# Patient Record
Sex: FEMALE | Race: WHITE | NOT HISPANIC OR LATINO | Employment: FULL TIME | URBAN - METROPOLITAN AREA
[De-identification: names, ages, dates, MRNs, and addresses within clinical notes are randomized per-mention and may not be internally consistent; named-entity substitution may affect disease eponyms.]

---

## 2017-08-16 ENCOUNTER — LAB REQUISITION (OUTPATIENT)
Dept: LAB | Facility: HOSPITAL | Age: 22
End: 2017-08-16
Payer: COMMERCIAL

## 2017-08-16 ENCOUNTER — ALLSCRIPTS OFFICE VISIT (OUTPATIENT)
Dept: OTHER | Facility: OTHER | Age: 22
End: 2017-08-16

## 2017-08-16 DIAGNOSIS — Z01.419 ENCOUNTER FOR GYNECOLOGICAL EXAMINATION WITHOUT ABNORMAL FINDING: ICD-10-CM

## 2017-08-16 PROCEDURE — G0143 SCR C/V CYTO,THINLAYER,RESCR: HCPCS | Performed by: OBSTETRICS & GYNECOLOGY

## 2017-08-21 LAB
LAB AP GYN PRIMARY INTERPRETATION: NORMAL
LAB AP LMP: NORMAL
Lab: NORMAL

## 2018-01-11 NOTE — MISCELLANEOUS
Message  Pt called she is at college and noticed a golf ball size lump in her vagina no itching, no d/c, not sore to touch it is internal Spoke to Dr Galina Toribio he will treat like a Bartholin cyst Keflex QID for 1 week warms soaks and if no better need to go to urgent care or health Center at school Rx called to [facility]      Active Problems    1  Bartholin gland cyst (616 2) (N75 0)   2  Dysmenorrhea (625 3) (N94 6)   3  Heavy Bleeding Between Periods (Metrorrhagia) (626 6)   4  Menometrorrhagia (626 2) (N92 1)   5  Vaginal candidiasis (112 1) (B37 3)   6  Vaginal itching (698 1) (L29 8)    Current Meds   1  Cephalexin 500 MG Oral Capsule; TAKE 1 CAPSULE 4 TIMES DAILY UNTIL GONE;   Therapy: 15KLW3067 to (Evaluate:90Ayp6765)  Requested for: 76Kxt2549; Last   Rx:[id number]; Status: ACTIVE - Retrospective Authorization Ordered   2  Junel FE 1 5/30 1 5-30 MG-MCG Oral Tablet; TAKE 1 TABLET DAILY AS DIRECTED; Therapy: 69ORV9848 to (Evaluate:40Sya4525)  Requested for: 46JOU8968; Last   Rx:[id number] Ordered   3  Terconazole 0 4 % Vaginal Cream; INSERT 1 APPLICATORFUL INTRAVAGINALLY AT   BEDTIME; Therapy: 58Fot2460 to (Last Rx:01Mlx2811)  Requested for: 70Lxu1575 Ordered    Allergies    1   Animal dander - Cats    Signatures   Electronically signed by : Román Dietz, ; Aug 19 2016 11:35AM EST                       (Author)

## 2018-01-14 VITALS
DIASTOLIC BLOOD PRESSURE: 88 MMHG | SYSTOLIC BLOOD PRESSURE: 128 MMHG | HEIGHT: 68 IN | WEIGHT: 293 LBS | BODY MASS INDEX: 44.41 KG/M2

## 2018-01-17 NOTE — MISCELLANEOUS
Message  Pt still getting BTB and prolonged periods was told to call or dose change Spoke to [place] will increase to 1 5/30 will start with new pack      Active Problems    1  Dysmenorrhea (625 3) (N94 6)   2  Heavy Bleeding Between Periods (Metrorrhagia) (626 6)   3  Menometrorrhagia (626 2) (N92 1)   4  Vaginal candidiasis (112 1) (B37 3)   5  Vaginal itching (698 1) (L29 8)    Current Meds   1  Terconazole 0 4 % Vaginal Cream; INSERT 1 APPLICATORFUL INTRAVAGINALLY AT   BEDTIME; Therapy: 39Nby8070 to (Last Rx:98Tuw3174)  Requested for: 06Ukc3695 Ordered    Allergies    1   Animal dander - Cats    Plan  Menometrorrhagia    · Junel FE 1 5/30 1 5-30 MG-MCG Oral Tablet; TAKE 1 TABLET DAILY AS  DIRECTED    Signatures   Electronically signed by : Brittany Marti, ; Jun 6 2016  3:59PM EST                       (Author)

## 2018-02-26 ENCOUNTER — TRANSCRIBE ORDERS (OUTPATIENT)
Dept: ADMINISTRATIVE | Facility: HOSPITAL | Age: 23
End: 2018-02-26

## 2018-02-26 DIAGNOSIS — S03.00XA DISLOCATION OF TEMPOROMANDIBULAR JOINT, INITIAL ENCOUNTER: Primary | ICD-10-CM

## 2018-03-12 ENCOUNTER — HOSPITAL ENCOUNTER (OUTPATIENT)
Dept: RADIOLOGY | Facility: HOSPITAL | Age: 23
Discharge: HOME | End: 2018-03-12
Payer: COMMERCIAL

## 2018-03-12 DIAGNOSIS — S03.00XA DISLOCATION OF TEMPOROMANDIBULAR JOINT, INITIAL ENCOUNTER: ICD-10-CM

## 2018-03-12 PROCEDURE — 70336 MAGNETIC IMAGE JAW JOINT: CPT

## 2018-05-16 ENCOUNTER — EVALUATION (OUTPATIENT)
Dept: PHYSICAL THERAPY | Facility: REHABILITATION | Age: 23
End: 2018-05-16
Payer: COMMERCIAL

## 2018-05-16 DIAGNOSIS — R68.84 JAW PAIN: Primary | ICD-10-CM

## 2018-05-16 PROCEDURE — 97140 MANUAL THERAPY 1/> REGIONS: CPT | Performed by: PHYSICAL THERAPIST

## 2018-05-16 PROCEDURE — G8991 OTHER PT/OT GOAL STATUS: HCPCS | Performed by: PHYSICAL THERAPIST

## 2018-05-16 PROCEDURE — 97161 PT EVAL LOW COMPLEX 20 MIN: CPT | Performed by: PHYSICAL THERAPIST

## 2018-05-16 PROCEDURE — G8990 OTHER PT/OT CURRENT STATUS: HCPCS | Performed by: PHYSICAL THERAPIST

## 2018-05-16 RX ORDER — NORETHINDRONE ACETATE AND ETHINYL ESTRADIOL 1MG-20(21)
1 KIT ORAL DAILY
COMMUNITY
End: 2018-09-21 | Stop reason: ALTCHOICE

## 2018-05-16 NOTE — PROGRESS NOTES
PT Evaluation     Today's date: 2018  Patient name: Carmel Aden  : 1995  MRN: [id number]  Referring provider: Pat Chandler DMD  Dx:   Encounter Diagnosis     ICD-10-CM    1  Jaw pain R68 84        Start Time: 6011  Stop Time: 7247  Total time in clinic (min): 32 minutes    Assessment  Impairments: abnormal or restricted ROM and pain with function    Assessment details: Decreased ROM mandibular depression with mild tenderness at Bilateral TMJ  Good for return to full function  Understanding of Dx/Px/POC: good  Goals  STG decrease pain 2-3/10  Increase ROM 5-10 mm  LTG decrease pain 0-1/10  ROM 35-40 mm mandibular depression    Plan  Patient would benefit from: skilled PT  Planned therapy interventions: manual therapy and neuromuscular re-education  Frequency: 1x week  Duration in weeks: 4  Treatment plan discussed with: patient  Plan details: Continue with modalities as needed with stretching and strengthening and postural exercise as tolerated          Subjective Evaluation    History of Present Illness  Date of surgery: 5/3/2018  Mechanism of injury: Arthroscopy to inject fluid to shift disc at B TMJ back into place  Pain  Current pain ratin  At best pain ratin  At worst pain rating: 3  Quality: dull ache and discomfort  Relieving factors: ice  Aggravating factors: eating          Objective     Palpation     Additional Palpation Details  Mild tenderness at Bilateral TMJ joints    Active Range of Motion   Cervical/Thoracic Spine   Cervical    Flexion: WFL  Extension: WFL  Left lateral flexion: WFL  Right lateral flexion: Neck active lateral bend right: 75%   Left rotation: Neck active rotation left: 80%   Right rotation: [facility]  TMJ   Jaw observations: facial symmetry within normal limits  Jaw findings: Mandibular depression 20 mm  Mandibular protrusion hyper mobile  Lateral excursion WFL and equal bilaterallly   Patient presents with: no jaw trauma  Dental findings: One filling no other findings  Wearing a mouth guard for night wear  Joint sounds left: normal      Flowsheet Rows      Most Recent Value   PT/OT G-Codes   Current Score  66   Projected Score  82   FOTO information reviewed  Yes   Assessment Type  Evaluation   G code set  Other PT/OT Primary   Other PT Primary Current Status ()  CJ   Other PT Primary Goal Status ()  CI          Precautions: TMJ surgery    Daily Treatment Diary     Manual  5/16            TMJ B MFR 10 min                                                                    Exercise Diary              TMJ active opening x5 with 5 sec hold            Passive mandibular depressino x3 with 10 sec hold            Scapular retraction nv            Cervical retraction nv            Shoulder extension nv Green TB                                                                                                                                                                                                                  Modalities

## 2018-05-16 NOTE — LETTER
May 17, 2018    Sara Reyes DMD  [address]  [address] [address]    Patient: Mikaela Boone   YOB: 1995   Date of Visit: 2018     Encounter Diagnosis     ICD-10-CM    1  Jaw pain R68 84        Dear Dr Gretchen Roche:    Please review the attached Plan of Care from [facility] recent visit  Please verify that you agree therapy should continue by signing the attached document and sending it back to our office  If you have any questions or concerns, please don't hesitate to call  Sincerely,    Brandyn Fernandez, PT      Referring Provider:      I certify that I have read the below Plan of Care and certify the need for these services furnished under this plan of treatment while under my care  Sara Reyes DMD  [address]: [phone]          PT Evaluation     Today's date: 2018  Patient name: Mikaela Boone  : 1995  MRN: [id number]  Referring provider: Russel Castillo DMD  Dx:   Encounter Diagnosis     ICD-10-CM    1  Jaw pain R68 84        Start Time: 1720  Stop Time: 1752  Total time in clinic (min): 32 minutes    Assessment  Impairments: abnormal or restricted ROM and pain with function    Assessment details: Decreased ROM mandibular depression with mild tenderness at Bilateral TMJ  Good for return to full function  Understanding of Dx/Px/POC: good  Goals  STG decrease pain 2-3/10  Increase ROM 5-10 mm  LTG decrease pain 0-1/10  ROM 35-40 mm mandibular depression    Plan  Patient would benefit from: skilled PT  Planned therapy interventions: manual therapy and neuromuscular re-education  Frequency: 1x week  Duration in weeks: 4  Treatment plan discussed with: patient  Plan details: Continue with modalities as needed with stretching and strengthening and postural exercise as tolerated          Subjective Evaluation    History of Present Illness  Date of surgery: 5/3/2018  Mechanism of injury: Arthroscopy to inject fluid to shift disc at B TMJ back into place  Pain  Current pain ratin  At best pain ratin  At worst pain rating: 3  Quality: dull ache and discomfort  Relieving factors: ice  Aggravating factors: eating          Objective     Palpation     Additional Palpation Details  Mild tenderness at Bilateral TMJ joints    Active Range of Motion   Cervical/Thoracic Spine   Cervical    Flexion: WFL  Extension: WFL  Left lateral flexion: WFL  Right lateral flexion: Neck active lateral bend right: 75%   Left rotation: Neck active rotation left: 80%   Right rotation: [facility]  TMJ   Jaw observations: facial symmetry within normal limits  Jaw findings: Mandibular depression 20 mm  Mandibular protrusion hyper mobile  Lateral excursion WFL and equal bilaterallly   Patient presents with: no jaw trauma  Dental findings: One filling no other findings  Wearing a mouth guard for night wear  Joint sounds left: normal      Flowsheet Rows      Most Recent Value   PT/OT G-Codes   Current Score  66   Projected Score  82   FOTO information reviewed  Yes   Assessment Type  Evaluation   G code set  Other PT/OT Primary   Other PT Primary Current Status ()  CJ   Other PT Primary Goal Status ()  CI          Precautions: TMJ surgery    Daily Treatment Diary     Manual              TMJ B MFR 10 min                                                                    Exercise Diary              TMJ active opening x5 with 5 sec hold            Passive mandibular depressino x3 with 10 sec hold            Scapular retraction nv            Cervical retraction nv            Shoulder extension nv Green TB                                                                                                                                                                                                                  Modalities

## 2018-05-23 ENCOUNTER — OFFICE VISIT (OUTPATIENT)
Dept: PHYSICAL THERAPY | Facility: REHABILITATION | Age: 23
End: 2018-05-23
Payer: COMMERCIAL

## 2018-05-23 DIAGNOSIS — R68.84 JAW PAIN: Primary | ICD-10-CM

## 2018-05-23 PROCEDURE — 97112 NEUROMUSCULAR REEDUCATION: CPT | Performed by: PHYSICAL THERAPIST

## 2018-05-23 NOTE — PROGRESS NOTES
Daily Note     Today's date: 2018  Patient name: Reinier Garcia  : 1995  MRN: [id number]  Referring provider: Treva Zepeda DMD  Dx:   Encounter Diagnosis     ICD-10-CM    1  Jaw pain R68 84                   Subjective: left side is starting to pop and having pain after the fact      Objective: See treatment diary below     Precautions: TMJ surgery     Daily Treatment Diary      Manual                     TMJ B MFR 10 min  8 min                                                                                            ROM  20 D 25D                         Exercise Diary                        TMJ active opening x5 with 5 sec hold  x7 with 5 sec hold                   Passive mandibular depressino x3 with 10 sec hold  x5 with 10 sec hold                   Scapular retraction nv  x 10 with 5 sec hold                   Cervical retraction nv  x 10 with 5 sechold                   Shoulder extension nv Green TB x 20                                                                                                                                                                                                                                                                                                                                                                                                 Modalities                                                                                                         Assessment: Tolerated treatment well  Patient exhibited good technique with therapeutic exercises  Actively opening to 25 mm but on last repetition started to have pain and dropped back to 22 mm      Plan: Continue per plan of care

## 2018-05-31 ENCOUNTER — APPOINTMENT (OUTPATIENT)
Dept: PHYSICAL THERAPY | Facility: REHABILITATION | Age: 23
End: 2018-05-31
Payer: COMMERCIAL

## 2018-06-01 ENCOUNTER — OFFICE VISIT (OUTPATIENT)
Dept: PHYSICAL THERAPY | Facility: REHABILITATION | Age: 23
End: 2018-06-01
Payer: COMMERCIAL

## 2018-06-01 DIAGNOSIS — R68.84 JAW PAIN: Primary | ICD-10-CM

## 2018-06-01 PROCEDURE — 97140 MANUAL THERAPY 1/> REGIONS: CPT | Performed by: PHYSICAL THERAPIST

## 2018-06-06 ENCOUNTER — OFFICE VISIT (OUTPATIENT)
Dept: PHYSICAL THERAPY | Facility: REHABILITATION | Age: 23
End: 2018-06-06
Payer: COMMERCIAL

## 2018-06-06 DIAGNOSIS — R68.84 JAW PAIN: Primary | ICD-10-CM

## 2018-06-06 PROCEDURE — 97140 MANUAL THERAPY 1/> REGIONS: CPT | Performed by: PHYSICAL THERAPIST

## 2018-06-06 NOTE — PROGRESS NOTES
Daily Note     Today's date: 2018  Patient name: Mikael Castro  : 1995  MRN: [id number]  Referring provider: Smita Sands DMD  Dx:   Encounter Diagnosis     ICD-10-CM    1  Jaw pain R68 84                   Subjective: I think I was clenching last night because the left side is really sore  Rating pain at 5/10      Objective: See treatment diary below     Precautions: TMJ surgery     Daily Treatment Diary      Manual                 TMJ B MFR 10 min  8 min  8 min  10min                                                                                        ROM  20 D 25D  20-25  17-22                     Exercise Diary                        TMJ active opening x5 with 5 sec hold  x7 with 5 sec hold  x7 with 5 sec hold  x 7 with 5 sec hold               Passive mandibular depression x3 with 10 sec hold  x5 with 10 sec hold  x 5 with 10 sec hold  x 5 10 sec hold               Scapular retraction nv  x 10 with 5 sec hold  green tb x 20  blue tb x 20               Cervical retraction nv  x 10 with 5 sechold  x 10  x 10               Shoulder extension nv Green TB x 20  blue tb x 20  black tb x 20                supine retraction      x 10 with 10 sec hold  x 10               Supine chin tuck with neck flexion     x5 with 5 sec hold  x 7                                                                                                                                                                                                                                                                                                                                             Modalities                                                                                                         Assessment: Tolerated treatment well  Patient exhibited good technique with therapeutic exercises  Slight loss of function with waking with pain this morning    Plan: Continue per plan of care

## 2018-06-13 ENCOUNTER — OFFICE VISIT (OUTPATIENT)
Dept: PHYSICAL THERAPY | Facility: REHABILITATION | Age: 23
End: 2018-06-13
Payer: COMMERCIAL

## 2018-06-13 DIAGNOSIS — R68.84 JAW PAIN: Primary | ICD-10-CM

## 2018-06-13 PROCEDURE — G8991 OTHER PT/OT GOAL STATUS: HCPCS | Performed by: PHYSICAL THERAPIST

## 2018-06-13 PROCEDURE — 97140 MANUAL THERAPY 1/> REGIONS: CPT | Performed by: PHYSICAL THERAPIST

## 2018-06-13 PROCEDURE — G8990 OTHER PT/OT CURRENT STATUS: HCPCS | Performed by: PHYSICAL THERAPIST

## 2018-06-13 PROCEDURE — 97112 NEUROMUSCULAR REEDUCATION: CPT | Performed by: PHYSICAL THERAPIST

## 2018-06-13 RX ORDER — CYCLOBENZAPRINE HCL 10 MG
10 TABLET ORAL 3 TIMES DAILY PRN
COMMUNITY
End: 2018-10-04 | Stop reason: HOSPADM

## 2018-06-13 NOTE — LETTER
2018    Brayden Carmona DMD  [address]  [address] [address]    Patient: Merna Jaimes   YOB: 1995   Date of Visit: 2018     Encounter Diagnosis     ICD-10-CM    1  Jaw pain R68 84        Dear Dr Peggye Jeans:    Please review the attached Plan of Care from [facility] recent visit  Please verify that you agree therapy should continue by signing the attached document and sending it back to our office  If you have any questions or concerns, please don't hesitate to call  Sincerely,    Stephanie Cuadra, PT      Referring Provider:      I certify that I have read the below Plan of Care and certify the need for these services furnished under this plan of treatment while under my care  Brayden Carmona DMD  [address]: [phone]          PT Evaluation     Today's date: 2018  Patient name: Merna Jaimes  : 1995  MRN: [id number]  Referring provider: Jana Nunn DMD  Dx:   Encounter Diagnosis     ICD-10-CM    1  Jaw pain R68 84        Start Time:   Stop Time: 1755  Total time in clinic (min): 25 minutes    Assessment  Impairments: abnormal or restricted ROM and pain with function    Assessment details: Decreased ROM mandibular depression with mild tenderness at Bilateral TMJ  Good for return to full function  Understanding of Dx/Px/POC: good  Goals  STG decrease pain 2-3/10  Increase ROM 5-10 mm  LTG decrease pain 0-1/10  ROM 35-40 mm mandibular depression    Plan  Patient would benefit from: skilled PT  Planned therapy interventions: manual therapy and neuromuscular re-education  Frequency: 1x week  Duration in weeks: 4  Treatment plan discussed with: patient  Plan details: Continue with modalities as needed with stretching and strengthening and postural exercise as tolerated          Subjective Evaluation    History of Present Illness  Date of surgery: 5/3/2018  Mechanism of injury: The doctor gave me a muscle relaxer   Pain  Current pain ratin  At best pain ratin  At worst pain ratin  Quality: radiating, dull ache, throbbing, tight, discomfort and pulling  Relieving factors: ice  Aggravating factors: eating          Objective     Palpation     Additional Palpation Details  Mild tenderness at Bilateral TMJ joints    Active Range of Motion   Cervical/Thoracic Spine   Cervical    Flexion: WFL  Extension: WFL  Left lateral flexion: WFL  Right lateral flexion: Neck active lateral bend right: 75%   Left rotation: Neck active rotation left: 80%   Right rotation: [facility]  TMJ   Jaw observations: facial symmetry within normal limits  Jaw findings: Mandibular depression 25 mm active 30 mm passive  Mandibular protrusion hyper mobile  Lateral excursion WFL and equal bilaterallly   Patient presents with: no jaw trauma  Dental findings: One filling no other findings  Wearing a mouth guard for night wear  Joint sounds left: normal      Flowsheet Rows      Most Recent Value   PT/OT G-Codes   Current Score  69   Projected Score  82   FOTO information reviewed  Yes   Assessment Type  Re-evaluation   G code set  Other PT/OT Primary   Other PT Primary Current Status ()  CJ   Other PT Primary Goal Status ()  CI          Precautions: TMJ surgery     Daily Treatment Diary      Manual               TMJ B MFR 10 min  8 min  8 min  10min  6 min                                                                                      ROM  20 D 25D  20-25  17-22  25--28                   Exercise Diary                        TMJ active opening x5 with 5 sec hold  x7 with 5 sec hold  x7 with 5 sec hold  x 7 with 5 sec hold  x 8 with sec             Passive mandibular depression x3 with 10 sec hold  x5 with 10 sec hold  x 5 with 10 sec hold  x 5 10 sec hold  x 5 with 10 sechold             Scapular retraction nv  x 10 with 5 sec hold  green tb x 20  blue tb x 20  black tb x 20             Cervical retraction nv  x 10 with 5 sechold  x 10  x 10  x 10             Shoulder extension nv Green TB x 20  blue tb x 20  black tb x 20  apollo 35# x                supine retraction      x 10 with 10 sec hold  x 10  x 10             Supine chin tuck with neck flexion     x5 with 5 sec hold  x 7  x 8                                                                                                                                                                                                                                                                                                                                           Modalities

## 2018-06-20 ENCOUNTER — OFFICE VISIT (OUTPATIENT)
Dept: PHYSICAL THERAPY | Facility: REHABILITATION | Age: 23
End: 2018-06-20
Payer: COMMERCIAL

## 2018-06-20 DIAGNOSIS — R68.84 JAW PAIN: Primary | ICD-10-CM

## 2018-06-20 PROCEDURE — 97140 MANUAL THERAPY 1/> REGIONS: CPT | Performed by: PHYSICAL THERAPIST

## 2018-06-20 NOTE — PROGRESS NOTES
Daily Note     Today's date: 2018  Patient name: Jered Yoo  : 1995  MRN: [id number]  Referring provider: Azael Babb DMD  Dx:   Encounter Diagnosis     ICD-10-CM    1  Jaw pain R68 84                   Subjective: I had a lot of pain over the weekend but it was because I was [id number]      Objective: See treatment diary below  Precautions: TMJ surgery     Daily Treatment Diary      Manual             TMJ B MFR 10 min  8 min  8 min  10min  6 min  9 min                                                                                    ROM  20 D 25D  20-25  17-22  25--28  23-                 Exercise Diary                        TMJ active opening x5 with 5 sec hold  x7 with 5 sec hold  x7 with 5 sec hold  x 7 with 5 sec hold  x 8 with sec  x 8           Passive mandibular depression x3 with 10 sec hold  x5 with 10 sec hold  x 5 with 10 sec hold  x 5 10 sec hold  x 5 with 10 sechold  x 5           Scapular retraction nv  x 10 with 5 sec hold  green tb x 20  blue tb x 20  black tb x 20  apollo 15# x 20           Cervical retraction nv  x 10 with 5 sechold  x 10  x 10  x 10  x 10           Shoulder extension nv Green TB x 20  blue tb x 20  black tb x 20  apollo 35# x  15  apollo 35# x 20            supine retraction      x 10 with 5 sec hold  x 10  x 10  x 10 with 5 sec hold           Supine chin tuck with neck flexion     x5 with 5 sec hold  x 7  x 8  x9            prone on elbows with neck extension           x5 with 5 sechold                                                                                                                                                                                                                                                                                                                 Modalities                                                                                                         Assessment: Tolerated treatment well  Patient would benefit from continued PT      Plan: Continue per plan of care

## 2018-06-27 ENCOUNTER — OFFICE VISIT (OUTPATIENT)
Dept: PHYSICAL THERAPY | Facility: REHABILITATION | Age: 23
End: 2018-06-27
Payer: COMMERCIAL

## 2018-06-27 DIAGNOSIS — R68.84 JAW PAIN: Primary | ICD-10-CM

## 2018-06-27 PROCEDURE — 97140 MANUAL THERAPY 1/> REGIONS: CPT | Performed by: PHYSICAL THERAPIST

## 2018-06-27 PROCEDURE — 97112 NEUROMUSCULAR REEDUCATION: CPT | Performed by: PHYSICAL THERAPIST

## 2018-06-27 NOTE — PROGRESS NOTES
Daily Note     Today's date: 2018  Patient name: Isaiah Person  : 1995  MRN: [id number]  Referring provider: Charlie Goodman DMD  Dx:   Encounter Diagnosis     ICD-10-CM    1  Jaw pain R68 84                   Subjective: I had a lot of sharp pain 2-3 days ago and time I opened my mouth   The doctor told me to continue for another month      Objective: See treatment diary below  Precautions: TMJ surgery     Daily Treatment Diary    27  Manual           TMJ B MFR 10 min  8 min  8 min  10min  6 min  9 min  +cervical 10 min                                                                                  ROM  20 D 25D  20-25  17-  25--28    25-30               Exercise Diary                        TMJ active opening x5 with 5 sec hold  x7 with 5 sec hold  x7 with 5 sec hold  x 7 with 5 sec hold  x 8 with sec  x 8  x8         Passive mandibular depression x3 with 10 sec hold  x5 with 10 sec hold  x 5 with 10 sec hold  x 5 10 sec hold  x 5 with 10 sechold  x 5  x 5         Scapular retraction nv  x 10 with 5 sec hold  green tb x 20  blue tb x 20  black tb x 20  apollo 15# x 20  apollo 20# x 20         Cervical retraction nv  x 10 with 5 sechold  x 10  x 10  x 10  x 10  x 10         Shoulder extension nv Green TB x 20  blue tb x 20  black tb x 20  apollo 35# x  15  apollo 35# x 20  apollo 45# x 15          supine retraction      x 10 with 5 sec hold  x 10  x 10  x 10 with 5 sec hold  x 10         Supine chin tuck with neck flexion     x5 with 5 sec hold  x 7  x 8  x9  x 10          prone on elbows with neck extension           x5 with 5 sechold  x7 with 5 sec hold                                                                                                                                                                                                                                                                                                             Modalities                                                                                                         Assessment: Tolerated treatment well  Patient would benefit from continued PT  Increased ROM noted and moderate spasm in mandibular muscles and paraspinals that responded well to manual therapy      Plan: Continue per plan of care

## 2018-07-05 ENCOUNTER — OFFICE VISIT (OUTPATIENT)
Dept: PHYSICAL THERAPY | Facility: REHABILITATION | Age: 23
End: 2018-07-05
Payer: COMMERCIAL

## 2018-07-05 DIAGNOSIS — R68.84 JAW PAIN: Primary | ICD-10-CM

## 2018-07-05 PROCEDURE — 97112 NEUROMUSCULAR REEDUCATION: CPT | Performed by: PHYSICAL THERAPIST

## 2018-07-05 PROCEDURE — 97140 MANUAL THERAPY 1/> REGIONS: CPT | Performed by: PHYSICAL THERAPIST

## 2018-07-05 NOTE — PROGRESS NOTES
Daily Note     Today's date: 2018  Patient name: Vimal Prieto  : 1995  MRN: [id number]  Referring provider: Wanda Gee DMD  Dx:   Encounter Diagnosis     ICD-10-CM    1  Jaw pain R68 84                   Subjective: I had a lot of sharp pain yesterday at 7/10 and today is 3/10 with no need for pain meds      Objective: See treatment diary below  Precautions: TMJ surgery     Daily Treatment Diary    27  Manual         TMJ B MFR 10 min  8 min  8 min  10min  6 min  9 min  +cervical 10 min  12 min                                                                                ROM  20 D 25D  20-25  17-  25--28    25-30  30 active and passive             Exercise Diary                        TMJ active opening x5 with 5 sec hold  x7 with 5 sec hold  x7 with 5 sec hold  x 7 with 5 sec hold  x 8 with sec  x 8  x8         Passive mandibular depression x3 with 10 sec hold  x5 with 10 sec hold  x 5 with 10 sec hold  x 5 10 sec hold  x 5 with 10 sechold  x 5  x 5  x5       Scapular retraction nv  x 10 with 5 sec hold  green tb x 20  blue tb x 20  black tb x 20  apollo 15# x 20  apollo 20# x 20  apollo 25# 20       Cervical retraction nv  x 10 with 5 sechold  x 10  x 10  x 10  x 10  x 10  x10       Shoulder extension nv Green TB x 20  blue tb x 20  black tb x 20  apollo 35# x  15  apollo 35# x 20  apollo 45# x 15  apollo 45# x 15        supine retraction      x 10 with 5 sec hold  x 10  x 10  x 10 with 5 sec hold  x 10  x 10       Supine chin tuck with neck flexion     x5 with 5 sec hold  x 7  x 8  x9  x 10  x 11        prone on elbows with neck extension           x5 with 5 sechold  x7 with 5 sec hold  x 10 with 5 xec hold                                                                                                                                                                                                                                                                                                             Modalities                                                                                                         Assessment: Tolerated treatment well  Patient would benefit from continued PT  Increased ROM noted with same for active and passive      Plan: Continue per plan of care

## 2018-07-11 ENCOUNTER — EVALUATION (OUTPATIENT)
Dept: PHYSICAL THERAPY | Facility: REHABILITATION | Age: 23
End: 2018-07-11
Payer: COMMERCIAL

## 2018-07-11 DIAGNOSIS — R68.84 JAW PAIN: Primary | ICD-10-CM

## 2018-07-11 PROCEDURE — G8990 OTHER PT/OT CURRENT STATUS: HCPCS | Performed by: PHYSICAL THERAPIST

## 2018-07-11 PROCEDURE — G8991 OTHER PT/OT GOAL STATUS: HCPCS | Performed by: PHYSICAL THERAPIST

## 2018-07-11 PROCEDURE — 97140 MANUAL THERAPY 1/> REGIONS: CPT | Performed by: PHYSICAL THERAPIST

## 2018-07-11 PROCEDURE — 97112 NEUROMUSCULAR REEDUCATION: CPT | Performed by: PHYSICAL THERAPIST

## 2018-07-11 NOTE — PROGRESS NOTES
PT Re-Evaluation     Today's date: 2018  Patient name: Mikael Castro  : 1995  MRN: [id number]  Referring provider: Smita Sands DMD  Dx:   Encounter Diagnosis     ICD-10-CM    1  Jaw pain R68 84        Start Time: 1703          Assessment  Impairments: abnormal or restricted ROM and pain with function    Assessment details: Increased ROM mandibular depression with no tenderness at Bilateral TMJ  Good for return to full function  Understanding of Dx/Px/POC: good  Goals  STG decrease pain 2-3/10  Increase ROM 5-10 mm  LTG decrease pain 0-1/10  ROM 35-40 mm mandibular depression    Plan  Patient would benefit from: skilled PT  Planned therapy interventions: manual therapy and neuromuscular re-education  Frequency: 1x week  Duration in weeks: 4  Treatment plan discussed with: patient  Plan details: Continue with modalities as needed with stretching and strengthening and postural exercise as tolerated  Subjective Evaluation    History of Present Illness  Date of surgery: 5/3/2018  Mechanism of injury: Occasional pain in left TMJ  Pain  Current pain ratin  At best pain ratin  At worst pain ratin  Location: left TMJ  Quality: dull ache, tight, discomfort and pulling  Relieving factors: ice  Aggravating factors: eating  Progression: improved    Treatments  Current treatment: physical therapy  Patient Goals  Patient goals for therapy: decreased pain and increased motion          Objective     Palpation   Left   Hypertonic in the cervical paraspinals, levator scapulae, middle trapezius and upper trapezius  Right   Hypertonic in the cervical paraspinals, levator scapulae, middle trapezius and upper trapezius       Additional Palpation Details  no tenderness at Bilateral TMJ joints    Active Range of Motion   Cervical/Thoracic Spine   Cervical    Flexion: WFL  Extension: WFL  Left lateral flexion: WFL  Right lateral flexion: WFL  Left rotation: Neck active rotation left: 90% Right rotation: [facility]  TMJ   Jaw observations: facial symmetry within normal limits  Jaw findings: Mandibular depression 30 mm active 33 mm passive  Mandibular protrusion hyper mobile  Lateral excursion WFL and equal bilaterallly   Patient presents with: no jaw trauma  Dental findings: One filling no other findings  Wearing a mouth guard for night wear  Joint sounds left: normal      Flowsheet Rows      Most Recent Value   PT/OT G-Codes   Current Score  71   Projected Score  82   FOTO information reviewed  Yes   Assessment Type  Re-evaluation   G code set  Other PT/OT Primary   Other PT Primary Current Status ()  CJ   Other PT Primary Goal Status ()  CI          Precautions: TMJ surgery     Daily Treatment Diary    27  Manual  5/16 5/23 6/1 6/6 6/13 6/20 6/27 7/5 7/11     TMJ B MFR 10 min  8 min  8 min  10min  6 min  9 min  +cervical 10 min  12 min  8 min                                                                              ROM  20 D 25D  20-25  17-22  25--28  23-27  25-30  30 active and passive  30-33 mm           Exercise Diary                        TMJ active opening x5 with 5 sec hold  x7 with 5 sec hold  x7 with 5 sec hold  x 7 with 5 sec hold  x 8 with sec  x 8  x8  x 10  x 10     Passive mandibular depression x3 with 10 sec hold  x5 with 10 sec hold  x 5 with 10 sec hold  x 5 10 sec hold  x 5 with 10 sechold  x 5  x 5  x5  x5     Scapular retraction nv  x 10 with 5 sec hold  green tb x 20  blue tb x 20  black tb x 20  apollo 15# x 20  apollo 20# x 20  apollo 25# 20  apollo 30# x 20     Cervical retraction nv  x 10 with 5 sechold  x 10  x 10  x 10  x 10  x 10  x10  x 10     Shoulder extension nv Green TB x 20  blue tb x 20  black tb x 20  apollo 35# x  15  apollo 35# x 20  apollo 45# x 15  apollo 45# x 15  apollo 45# x 16      supine retraction      x 10 with 5 sec hold  x 10  x 10  x 10 with 5 sec hold  x 10  x 10  x 10     Supine chin tuck with neck flexion     x5 with 5 sec hold  x 7  x 8  x9  x 10  x 11  x 10      prone on elbows with neck extension           x5 with 5 sechold  x7 with 5 sec hold  x 10 with 5 sec hold  x 10 with 5 sec hold                                                                                                                                                                                                                                                                                                           Modalities

## 2018-07-11 NOTE — LETTER
2018    Heather David DMD  [address] [address]    Patient: Sylvia Grant   YOB: 1995   Date of Visit: 2018     Encounter Diagnosis     ICD-10-CM    1  Jaw pain R68 84        Dear Dr Carli Li:    Please review the attached Plan of Care from [facility] recent visit  Please verify that you agree therapy should continue by signing the attached document and sending it back to our office  If you have any questions or concerns, please don't hesitate to call  Sincerely,    Iraj Silva, PT      Referring Provider:      I certify that I have read the below Plan of Care and certify the need for these services furnished under this plan of treatment while under my care  Heather David DMD  [address]: [phone]          PT Re-Evaluation     Today's date: 2018  Patient name: Sylvia Grant  : 1995  MRN: [id number]  Referring provider: Iris Jeronimo DMD  Dx:   Encounter Diagnosis     ICD-10-CM    1  Jaw pain R68 84        Start Time: 1703          Assessment  Impairments: abnormal or restricted ROM and pain with function    Assessment details: Increased ROM mandibular depression with no tenderness at Bilateral TMJ  Good for return to full function  Understanding of Dx/Px/POC: good  Goals  STG decrease pain 2-3/10  Increase ROM 5-10 mm  LTG decrease pain 0-1/10  ROM 35-40 mm mandibular depression    Plan  Patient would benefit from: skilled PT  Planned therapy interventions: manual therapy and neuromuscular re-education  Frequency: 1x week  Duration in weeks: 4  Treatment plan discussed with: patient  Plan details: Continue with modalities as needed with stretching and strengthening and postural exercise as tolerated          Subjective Evaluation    History of Present Illness  Date of surgery: 5/3/2018  Mechanism of injury: Occasional pain in left TMJ  Pain  Current pain ratin  At best pain ratin  At worst pain ratin  Location: left TMJ  Quality: dull ache, tight, discomfort and pulling  Relieving factors: ice  Aggravating factors: eating  Progression: improved    Treatments  Current treatment: physical therapy  Patient Goals  Patient goals for therapy: decreased pain and increased motion          Objective     Palpation   Left   Hypertonic in the cervical paraspinals, levator scapulae, middle trapezius and upper trapezius  Right   Hypertonic in the cervical paraspinals, levator scapulae, middle trapezius and upper trapezius       Additional Palpation Details  no tenderness at Bilateral TMJ joints    Active Range of Motion   Cervical/Thoracic Spine   Cervical    Flexion: WFL  Extension: WFL  Left lateral flexion: WFL  Right lateral flexion: WFL  Left rotation: Neck active rotation left: 90%   Right rotation: [facility]  TMJ   Jaw observations: facial symmetry within normal limits  Jaw findings: Mandibular depression 30 mm active 33 mm passive  Mandibular protrusion hyper mobile  Lateral excursion WFL and equal bilaterallly   Patient presents with: no jaw trauma  Dental findings: One filling no other findings  Wearing a mouth guard for night wear  Joint sounds left: normal      Flowsheet Rows      Most Recent Value   PT/OT G-Codes   Current Score  71   Projected Score  82   FOTO information reviewed  Yes   Assessment Type  Re-evaluation   G code set  Other PT/OT Primary   Other PT Primary Current Status ()  CJ   Other PT Primary Goal Status ()  CI          Precautions: TMJ surgery     Daily Treatment Diary    27  Manual       TMJ B MFR 10 min  8 min  8 min  10min  6 min  9 min  +cervical 10 min  12 min  8 min                                                                              ROM  20 D 25D  20-25  17-22  25--28  -  25-30  30 active and passive  30-33 mm           Exercise Diary                        TMJ active opening x5 with 5 sec hold  x7 with 5 sec hold  x7 with 5 sec hold  x 7 with 5 sec hold  x 8 with sec  x 8  x8  x 10  x 10     Passive mandibular depression x3 with 10 sec hold  x5 with 10 sec hold  x 5 with 10 sec hold  x 5 10 sec hold  x 5 with 10 sechold  x 5  x 5  x5  x5     Scapular retraction nv  x 10 with 5 sec hold  green tb x 20  blue tb x 20  black tb x 20  apollo 15# x 20  apollo 20# x 20  apollo 25# 20  apollo 30# x 20     Cervical retraction nv  x 10 with 5 sechold  x 10  x 10  x 10  x 10  x 10  x10  x 10     Shoulder extension nv Green TB x 20  blue tb x 20  black tb x 20  apollo 35# x  15  apollo 35# x 20  apollo 45# x 15  apollo 45# x 15  apollo 45# x 16      supine retraction      x 10 with 5 sec hold  x 10  x 10  x 10 with 5 sec hold  x 10  x 10  x 10     Supine chin tuck with neck flexion     x5 with 5 sec hold  x 7  x 8  x9  x 10  x 11  x 10      prone on elbows with neck extension           x5 with 5 sechold  x7 with 5 sec hold  x 10 with 5 sec hold  x 10 with 5 sec hold                                                                                                                                                                                                                                                                                                           Modalities

## 2018-07-18 ENCOUNTER — OFFICE VISIT (OUTPATIENT)
Dept: PHYSICAL THERAPY | Facility: REHABILITATION | Age: 23
End: 2018-07-18
Payer: COMMERCIAL

## 2018-07-18 DIAGNOSIS — R68.84 JAW PAIN: Primary | ICD-10-CM

## 2018-07-18 PROCEDURE — 97140 MANUAL THERAPY 1/> REGIONS: CPT | Performed by: PHYSICAL THERAPIST

## 2018-07-18 PROCEDURE — 97112 NEUROMUSCULAR REEDUCATION: CPT | Performed by: PHYSICAL THERAPIST

## 2018-07-18 NOTE — PROGRESS NOTES
Daily Note     Today's date: 2018  Patient name: Shiloh Butler  : 1995  MRN: [id number]  Referring provider: Tawny Haskins DMD  Dx:   Encounter Diagnosis     ICD-10-CM    1  Jaw pain R68 84                   Subjective: My jaw feels great today but my neck is stiff because I slept awkward   The jaw was really sore yesterday though      Objective: See treatment diary below  Precautions: TMJ surgery     Daily Treatment Diary      Manual     TMJ B MFR 10 min  8 min  8 min  10min  6 min  9 min  +cervical 10 min  12 min  8 min  14min                                                                            ROM  20 D 25D  20-25  17-  25--28  -  25-30  30 active and passive  30-33 mm  35 passive and active         Exercise Diary                        TMJ active opening x5 with 5 sec hold  x7 with 5 sec hold  x7 with 5 sec hold  x 7 with 5 sec hold  x 8 with sec  x 8  x8  x 10  x 10  x 10   Passive mandibular depression x3 with 10 sec hold  x5 with 10 sec hold  x 5 with 10 sec hold  x 5 10 sec hold  x 5 with 10 sechold  x 5  x 5  x5  x5  x5   Scapular retraction nv  x 10 with 5 sec hold  green tb x 20  blue tb x 20  black tb x 20  apollo 15# x 20  apollo 20# x 20  apollo 25# 20  apollo 30# x 20  apollo 35# x 20   Cervical retraction nv  x 10 with 5 sechold  x 10  x 10  x 10  x 10  x 10  x10  x 10  x 10   Shoulder extension nv Green TB x 20  blue tb x 20  black tb x 20  apollo 35# x  15  apollo 35# x 20  apollo 45# x 15  apollo 45# x 15  apollo 45# x 16  apollo 45# x 16    supine retraction      x 10 with 5 sec hold  x 10  x 10  x 10 with 5 sec hold  x 10  x 10  x 10  x 10   Supine chin tuck with neck flexion     x5 with 5 sec hold  x 7  x 8  x9  x 10  x 11  x 10  x 10    prone on elbows with neck extension           x5 with 5 sechold  x7 with 5 sec hold  x 10 with 5 sec hold  x 10 with 5 sec hold  x 10                                                                                                                                                                                                                                                                                                         Modalities                                                                                                        Assessment: Tolerated treatment well  Patient demonstrated fatigue post treatment  Less tension in jaw but more in cervical region today      Plan: Continue per plan of care

## 2018-07-25 ENCOUNTER — OFFICE VISIT (OUTPATIENT)
Dept: PHYSICAL THERAPY | Facility: REHABILITATION | Age: 23
End: 2018-07-25
Payer: COMMERCIAL

## 2018-07-25 DIAGNOSIS — R68.84 JAW PAIN: Primary | ICD-10-CM

## 2018-07-25 PROCEDURE — 97140 MANUAL THERAPY 1/> REGIONS: CPT | Performed by: PHYSICAL THERAPIST

## 2018-07-25 PROCEDURE — 97112 NEUROMUSCULAR REEDUCATION: CPT | Performed by: PHYSICAL THERAPIST

## 2018-07-25 NOTE — PROGRESS NOTES
Daily Note     Today's date: 2018  Patient name: Camille Norman  : 1995  MRN: [id number]  Referring provider: Suzie Schneider DMD  Dx:   Encounter Diagnosis     ICD-10-CM    1  Jaw pain R68 84                   Subjective: I got a sinus infection on Saturday so my jaw was really sore      Objective: See treatment diary below  Precautions: TMJ surgery     Daily Treatment Diary      Manual     TMJ B MFR 12 min  8 min  8 min  10min  6 min  9 min  +cervical 10 min  12 min  8 min  14min                                                                            ROM  35 passive and active 25D  20-25  17-22  25--28  23-  25-30  30 active and passive  30-33 mm  35 passive and active         Exercise Diary                        TMJ active opening x10 with 5 sec hold  x7 with 5 sec hold  x7 with 5 sec hold  x 7 with 5 sec hold  x 8 with sec  x 8  x8  x 10  x 10  x 10   Passive mandibular depression x5 with 10 sec hold  x5 with 10 sec hold  x 5 with 10 sec hold  x 5 10 sec hold  x 5 with 10 sechold  x 5  x 5  x5  x5  x5   Scapular retraction apollo 45# x 15  x 10 with 5 sec hold  green tb x 20  blue tb x 20  black tb x 20  apollo 15# x 20  apollo 20# x 20  apollo 25# 20  apollo 30# x 20  apollo 35# x 20   Cervical retraction x10 with 5 sec hold  x 10 with 5 sechold  x 10  x 10  x 10  x 10  x 10  x10  x 10  x 10   Shoulder extension apollo 45# x 20 Green TB x 20  blue tb x 20  black tb x 20  apollo 35# x  15  apollo 35# x 20  apollo 45# x 15  apollo 45# x 15  apollo 45# x 16  apollo 45# x 16    supine retraction  x10 with 5 sec hold    x 10 with 5 sec hold  x 10  x 10  x 10 with 5 sec hold  x 10  x 10  x 10  x 10   Supine chin tuck with neck flexion  10 with 5 sec hold   x5 with 5 sec hold  x 7  x 8  x9  x 10  x 11  x 10  x 10    prone on elbows with neck extension  x 10 with 5 sec hold         x5 with 5 sechold  x7 with 5 sec hold  x 10 with 5 sec hold  x 10 with 5 sec hold  x 10                                                                                                                                                                                                                                                                                                         Modalities                                                                                                        Assessment: Tolerated treatment well  Patient demonstrated fatigue post treatment  Active and passive both at 35 mm  Will await further direction from office visit tomorrow      Plan: Continue per plan of care

## 2018-08-15 ENCOUNTER — APPOINTMENT (OUTPATIENT)
Dept: PHYSICAL THERAPY | Facility: REHABILITATION | Age: 23
End: 2018-08-15
Payer: COMMERCIAL

## 2018-08-16 ENCOUNTER — OFFICE VISIT (OUTPATIENT)
Dept: PHYSICAL THERAPY | Facility: REHABILITATION | Age: 23
End: 2018-08-16
Payer: COMMERCIAL

## 2018-08-16 DIAGNOSIS — R68.84 JAW PAIN: Primary | ICD-10-CM

## 2018-08-16 PROCEDURE — G8990 OTHER PT/OT CURRENT STATUS: HCPCS | Performed by: PHYSICAL THERAPIST

## 2018-08-16 PROCEDURE — G8991 OTHER PT/OT GOAL STATUS: HCPCS | Performed by: PHYSICAL THERAPIST

## 2018-08-16 PROCEDURE — 97112 NEUROMUSCULAR REEDUCATION: CPT | Performed by: PHYSICAL THERAPIST

## 2018-08-16 NOTE — LETTER
2018    Heather David DMD  [address] [address]    Patient: Sylvia Grant   YOB: 1995   Date of Visit: 2018     Encounter Diagnosis     ICD-10-CM    1  Jaw pain R68 84        Dear Dr Carli Li:    Please review the attached Plan of Care from [facility] recent visit  Please verify that you agree therapy should continue by signing the attached document and sending it back to our office  If you have any questions or concerns, please don't hesitate to call  Sincerely,    Iraj Silva, PT      Referring Provider:      I certify that I have read the below Plan of Care and certify the need for these services furnished under this plan of treatment while under my care  Heather David DMD  [address]          PT Re-Evaluation     Today's date: 2018  Patient name: Sylvia Grant  : 1995  MRN: [id number]  Referring provider: Iris Jeronimo DMD  Dx:   Encounter Diagnosis     ICD-10-CM    1  Jaw pain R68 84        Start Time: 702          Assessment  Impairments: abnormal or restricted ROM and pain with function    Assessment details: Increased ROM mandibular depression with no tenderness at Bilateral TMJ  Would benefit from weaning down from therapy every other week for 2 months  Understanding of Dx/Px/POC: good  Goals  STG decrease pain 1-2/10  Increase ROM 5 mm  LTG decrease pain 0-1/10  ROM 35-40 mm mandibular depression    Plan  Patient would benefit from: skilled PT  Planned therapy interventions: manual therapy and neuromuscular re-education  Frequency: 1x week  Duration in weeks: 4  Treatment plan discussed with: patient  Plan details: Continue with modalities as needed with stretching and strengthening and postural exercise as tolerated          Subjective Evaluation    History of Present Illness  Date of surgery: 5/3/2018  Mechanism of injury: Occasional pain in left TMJ  Pain  Current pain ratin  At best pain ratin  At worst pain ratin  Location: left TMJ  Quality: dull ache, tight, discomfort and pulling  Relieving factors: ice  Aggravating factors: eating  Progression: improved    Treatments  Current treatment: physical therapy  Patient Goals  Patient goals for therapy: decreased pain and increased motion          Objective     Palpation   Left   Hypertonic in the cervical paraspinals, levator scapulae, middle trapezius and upper trapezius  Right   Hypertonic in the cervical paraspinals, levator scapulae, middle trapezius and upper trapezius       Additional Palpation Details  no tenderness at Bilateral TMJ joints    Active Range of Motion   Cervical/Thoracic Spine   Cervical    Flexion: WFL  Extension: WFL  Left lateral flexion: WFL  Right lateral flexion: WFL  Left rotation: [facility]  Right rotation: [facility]  TMJ   Jaw observations: facial symmetry within normal limits  Jaw findings: Mandibular depression 35 mm active 38 mm passive  Mandibular protrusion hyper mobile  Lateral excursion WFL and equal bilaterallly   Patient presents with: no jaw trauma  Dental findings: One filling no other findings  Wearing a mouth guard for night wear  Joint sounds left: normal      Flowsheet Rows      Most Recent Value   PT/OT G-Codes   Current Score  86   Projected Score  82   FOTO information reviewed  Yes   Assessment Type  Re-evaluation   G code set  Other PT/OT Primary   Other PT Primary Current Status ()  CI   Other PT Primary Goal Status ()  CI          Precautions: TMJ surgery  Daily Treatment Diary      Manual     TMJ B MFR 12 min  5 min  8 min  10min  6 min  9 min  +cervical 10 min  12 min  8 min  14min                                                                            ROM  35 passive and active 35 A 38 P  20-25  17-22  25--28  23-  25-30  30 active and passive  30-33 mm  35 passive and active         Exercise Diary                        TMJ active opening x10 with 5 sec hold  x10 with 5 sec hold  x7 with 5 sec hold  x 7 with 5 sec hold  x 8 with sec  x 8  x8  x 10  x 10  x 10   Passive mandibular depression x5 with 10 sec hold  x5 with 10 sec hold  x 5 with 10 sec hold  x 5 10 sec hold  x 5 with 10 sechold  x 5  x 5  x5  x5  x5   Scapular retraction apollo 45# x 15  apollo 45# x 15  green tb x 20  blue tb x 20  black tb x 20  apollo 15# x 20  apollo 20# x 20  apollo 25# 20  apollo 30# x 20  apollo 35# x 20   Cervical retraction x10 with 5 sec hold  x 10 with 5 sechold  x 10  x 10  x 10  x 10  x 10  x10  x 10  x 10   Shoulder extension apollo 45# x 20 Apollo 55# x 10  blue tb x 20  black tb x 20  apollo 35# x  15  apollo 35# x 20  apollo 45# x 15  apollo 45# x 15  apollo 45# x 16  apollo 45# x 16    supine retraction  x10 with 5 sec hold  x 10  x 10 with 5 sec hold  x 10  x 10  x 10 with 5 sec hold  x 10  x 10  x 10  x 10   Supine chin tuck with neck flexion  10 with 5 sec hold  x 10 x5 with 5 sec hold  x 7  x 8  x9  x 10  x 11  x 10  x 10    prone on elbows with neck extension  x 10 with 5 sec hold  x 10       x5 with 5 sechold  x7 with 5 sec hold  x 10 with 5 sec hold  x 10 with 5 sec hold  x 10                                                                                                                                                                                                                                                                                                         Modalities

## 2018-08-16 NOTE — PROGRESS NOTES
PT Re-Evaluation     Today's date: 2018  Patient name: Joyce Garcia  : 1995  MRN: [id number]  Referring provider: Kate Neri DMD  Dx:   Encounter Diagnosis     ICD-10-CM    1  Jaw pain R68 84        Start Time: 07          Assessment  Impairments: abnormal or restricted ROM and pain with function    Assessment details: Increased ROM mandibular depression with no tenderness at Bilateral TMJ  Would benefit from weaning down from therapy every other week for 2 months  Understanding of Dx/Px/POC: good  Goals  STG decrease pain 1-2/10  Increase ROM 5 mm  LTG decrease pain 0-1/10  ROM 35-40 mm mandibular depression    Plan  Patient would benefit from: skilled PT  Planned therapy interventions: manual therapy and neuromuscular re-education  Frequency: 1x week  Duration in weeks: 4  Treatment plan discussed with: patient  Plan details: Continue with modalities as needed with stretching and strengthening and postural exercise as tolerated  Subjective Evaluation    History of Present Illness  Date of surgery: 5/3/2018  Mechanism of injury: Occasional pain in left TMJ  Pain  Current pain ratin  At best pain ratin  At worst pain ratin  Location: left TMJ  Quality: dull ache, tight, discomfort and pulling  Relieving factors: ice  Aggravating factors: eating  Progression: improved    Treatments  Current treatment: physical therapy  Patient Goals  Patient goals for therapy: decreased pain and increased motion          Objective     Palpation   Left   Hypertonic in the cervical paraspinals, levator scapulae, middle trapezius and upper trapezius  Right   Hypertonic in the cervical paraspinals, levator scapulae, middle trapezius and upper trapezius       Additional Palpation Details  no tenderness at Bilateral TMJ joints    Active Range of Motion   Cervical/Thoracic Spine   Cervical    Flexion: WFL  Extension: WFL  Left lateral flexion: WFL  Right lateral flexion: [facility] rotation: WFL  Right rotation: [facility]  TMJ   Jaw observations: facial symmetry within normal limits  Jaw findings: Mandibular depression 35 mm active 38 mm passive  Mandibular protrusion hyper mobile  Lateral excursion WFL and equal bilaterallly   Patient presents with: no jaw trauma  Dental findings: One filling no other findings  Wearing a mouth guard for night wear  Joint sounds left: normal      Flowsheet Rows      Most Recent Value   PT/OT G-Codes   Current Score  86   Projected Score  82   FOTO information reviewed  Yes   Assessment Type  Re-evaluation   G code set  Other PT/OT Primary   Other PT Primary Current Status ()  CI   Other PT Primary Goal Status ()  CI          Precautions: TMJ surgery  Daily Treatment Diary      Manual  7/25 8/16 6/1 6/6 6/13 6/20 6/27 7/5 7/11 7/18   TMJ B MFR 12 min  5 min  8 min  10min  6 min  9 min  +cervical 10 min  12 min  8 min  14min                                                                            ROM  35 passive and active 35 A 38 P  20-25  17-22  25--28  23-27  25-30  30 active and passive  30-33 mm  35 passive and active         Exercise Diary                        TMJ active opening x10 with 5 sec hold  x10 with 5 sec hold  x7 with 5 sec hold  x 7 with 5 sec hold  x 8 with sec  x 8  x8  x 10  x 10  x 10   Passive mandibular depression x5 with 10 sec hold  x5 with 10 sec hold  x 5 with 10 sec hold  x 5 10 sec hold  x 5 with 10 sechold  x 5  x 5  x5  x5  x5   Scapular retraction apollo 45# x 15  apollo 45# x 15  green tb x 20  blue tb x 20  black tb x 20  apollo 15# x 20  apollo 20# x 20  apollo 25# 20  apollo 30# x 20  apollo 35# x 20   Cervical retraction x10 with 5 sec hold  x 10 with 5 sechold  x 10  x 10  x 10  x 10  x 10  x10  x 10  x 10   Shoulder extension apollo 45# x 20 Apollo 55# x 10  blue tb x 20  black tb x 20  apollo 35# x  15  apollo 35# x 20  apollo 45# x 15  apollo 45# x 15  apollo 45# x 16  apollo 45# x 16    supine retraction  x10 with 5 sec hold  x 10  x 10 with 5 sec hold  x 10  x 10  x 10 with 5 sec hold  x 10  x 10  x 10  x 10   Supine chin tuck with neck flexion  10 with 5 sec hold  x 10 x5 with 5 sec hold  x 7  x 8  x9  x 10  x 11  x 10  x 10    prone on elbows with neck extension  x 10 with 5 sec hold  x 10       x5 with 5 sechold  x7 with 5 sec hold  x 10 with 5 sec hold  x 10 with 5 sec hold  x 10                                                                                                                                                                                                                                                                                                         Modalities

## 2018-08-17 ENCOUNTER — TRANSCRIBE ORDERS (OUTPATIENT)
Dept: PHYSICAL THERAPY | Facility: REHABILITATION | Age: 23
End: 2018-08-17

## 2018-08-17 DIAGNOSIS — R68.84 JAW PAIN: Primary | ICD-10-CM

## 2018-08-29 ENCOUNTER — APPOINTMENT (OUTPATIENT)
Dept: PHYSICAL THERAPY | Facility: REHABILITATION | Age: 23
End: 2018-08-29
Payer: COMMERCIAL

## 2018-08-30 ENCOUNTER — OFFICE VISIT (OUTPATIENT)
Dept: PHYSICAL THERAPY | Facility: REHABILITATION | Age: 23
End: 2018-08-30
Payer: COMMERCIAL

## 2018-08-30 DIAGNOSIS — R68.84 JAW PAIN: Primary | ICD-10-CM

## 2018-08-30 PROCEDURE — 97140 MANUAL THERAPY 1/> REGIONS: CPT | Performed by: PHYSICAL THERAPIST

## 2018-08-30 PROCEDURE — 97112 NEUROMUSCULAR REEDUCATION: CPT | Performed by: PHYSICAL THERAPIST

## 2018-08-30 NOTE — PROGRESS NOTES
Daily Note     Today's date: 2018  Patient name: Carmel Aden  : 1995  MRN: [id number]  Referring provider: Pat Chandler DMD  Dx:   Encounter Diagnosis     ICD-10-CM    1  Jaw pain R68 84                   Subjective: I've been sore but not horrible secondary to stress with return to school      Objective: See treatment diary below  Precautions: TMJ surgery  Daily Treatment Diary      Manual     TMJ B MFR 12 min  5 min  11 min  10min  6 min  9 min  +cervical 10 min  12 min  8 min  14min                                                                            ROM  35 passive and active 35 A 38 P   17-22  25--28  23-  25-30  30 active and passive  30-33 mm  35 passive and active         Exercise Diary                        TMJ active opening x10 with 5 sec hold  x10 with 5 sec hold  x10 with 5 sec hold  x 7 with 5 sec hold  x 8 with sec  x 8  x8  x 10  x 10  x 10   Passive mandibular depression x5 with 10 sec hold  x5 with 10 sec hold  x 5 with 10 sec hold  x 5 10 sec hold  x 5 with 10 sechold  x 5  x 5  x5  x5  x5   Scapular retraction apollo 45# x 15  apollo 45# x 15 Apollo 45# x 15  blue tb x 20  black tb x 20  apollo 15# x 20  apollo 20# x 20  apollo 25# 20  apollo 30# x 20  apollo 35# x 20   Cervical retraction x10 with 5 sec hold  x 10 with 5 sechold  x 10  x 10  x 10  x 10  x 10  x10  x 10  x 10   Shoulder extension apollo 45# x 20 Apollo 55# x 10  apollo 55# x 12  black tb x 20  apollo 35# x  15  apollo 35# x 20  apollo 45# x 15  apollo 45# x 15  apollo 45# x 16  apollo 45# x 16    supine retraction  x10 with 5 sec hold  x 10  x 10 with 5 sec hold  x 10  x 10  x 10 with 5 sec hold  x 10  x 10  x 10  x 10   Supine chin tuck with neck flexion  10 with 5 sec hold  x 10 x10 with 5 sec hold  x 7  x 8  x9  x 10  x 11  x 10  x 10    prone on elbows with neck extension  x 10 with 5 sec hold  x 10  x 10     x5 with 5 sechold  x7 with 5 sec hold  x 10 with 5 sec hold  x 10 with 5 sec hold  x 10                                                                                                                                                                                                                                                                                                           Assessment: Tolerated treatment well  Patient would benefit from continued PT      Plan: Continue per plan of care

## 2018-09-12 ENCOUNTER — OFFICE VISIT (OUTPATIENT)
Dept: PHYSICAL THERAPY | Facility: REHABILITATION | Age: 23
End: 2018-09-12
Payer: COMMERCIAL

## 2018-09-12 DIAGNOSIS — R68.84 JAW PAIN: Primary | ICD-10-CM

## 2018-09-12 PROCEDURE — 97112 NEUROMUSCULAR REEDUCATION: CPT | Performed by: PHYSICAL THERAPIST

## 2018-09-12 PROCEDURE — 97110 THERAPEUTIC EXERCISES: CPT | Performed by: PHYSICAL THERAPIST

## 2018-09-12 NOTE — PROGRESS NOTES
Daily Note     Today's date: 2018  Patient name: Shiloh Butler  : 1995  MRN: [id number]  Referring provider: Tawny Haskins DMD  Dx:   Encounter Diagnosis     ICD-10-CM    1  Jaw pain R68 84                   Subjective: I've been tight and sore in my jaw and high neck pain at my skull      Objective: See treatment diary below  Precautions: TMJ surgery  Daily Treatment Diary      Manual     TMJ B MFR 12 min  5 min  11 min 8 min  6 min  9 min  +cervical 10 min  12 min  8 min  14min                                                                            ROM  35 passive and active 35 A 38 P     25--28  23-27  25-30  30 active and passive  30-33 mm  35 passive and active         Exercise Diary                        TMJ active opening x10 with 5 sec hold  x10 with 5 sec hold  x10 with 5 sec hold  x10 with 5 sec hold  x 8 with sec  x 8  x8  x 10  x 10  x 10   Passive mandibular depression x5 with 10 sec hold  x5 with 10 sec hold  x 5 with 10 sec hold  x 5 10 sec hold  x 5 with 10 sechold  x 5  x 5  x5  x5  x5   Scapular retraction apollo 45# x 15  apollo 45# x 15 Apollo 45# x 15  apollo 45# x 17  black tb x 20  apollo 15# x 20  apollo 20# x 20  apollo 25# 20  apollo 30# x 20  apollo 35# x 20   Cervical retraction x10 with 5 sec hold  x 10 with 5 sechold  x 10  x 10  x 10  x 10  x 10  x10  x 10  x 10   Shoulder extension apollo 45# x 20 Apollo 55# x 10  apollo 55# x 12 apollo 55#x 12   apollo 35# x  15  apollo 35# x 20  apollo 45# x 15  apollo 45# x 15  apollo 45# x 16  apollo 45# x 16    supine retraction  x10 with 5 sec hold  x 10  x 10 with 5 sec hold  x 10  x 10  x 10 with 5 sec hold  x 10  x 10  x 10  x 10   Supine chin tuck with neck flexion  10 with 5 sec hold  x 10 x10 with 5 sec hold  x 10  x 8  x9  x 10  x 11  x 10  x 10    prone on elbows with neck extension  x 10 with 5 sec hold  x 10  x 10  x 10   x5 with 5 sechold  x7 with 5 sec hold  x 10 with 5 sec hold  x 10 with 5 sec hold  x 10                                                                                                                                                                                                                                                                                                           Assessment: Tolerated treatment well  Patient would benefit from continued PT  Tight in left mandibular muscles      Plan: Continue per plan of care

## 2018-09-26 ENCOUNTER — OFFICE VISIT (OUTPATIENT)
Dept: PHYSICAL THERAPY | Facility: REHABILITATION | Age: 23
End: 2018-09-26
Payer: COMMERCIAL

## 2018-09-26 DIAGNOSIS — R68.84 JAW PAIN: Primary | ICD-10-CM

## 2018-09-26 PROCEDURE — G8990 OTHER PT/OT CURRENT STATUS: HCPCS | Performed by: PHYSICAL THERAPIST

## 2018-09-26 PROCEDURE — G8991 OTHER PT/OT GOAL STATUS: HCPCS | Performed by: PHYSICAL THERAPIST

## 2018-09-26 PROCEDURE — 97140 MANUAL THERAPY 1/> REGIONS: CPT | Performed by: PHYSICAL THERAPIST

## 2018-09-26 PROCEDURE — 97112 NEUROMUSCULAR REEDUCATION: CPT | Performed by: PHYSICAL THERAPIST

## 2018-09-26 NOTE — PROGRESS NOTES
Daily Note     Today's date: 2018  Patient name: Sandra Leonard  : 1995  MRN: [id number]  Referring provider: Lakshmi Clinton DMD  Dx:   Encounter Diagnosis     ICD-10-CM    1  Jaw pain R68 84                   Subjective: I have intermittent tightness but not too bad      Objective: See treatment diary below  Precautions: TMJ surgery  Daily Treatment Diary      Manual     TMJ B MFR 12 min  5 min  11 min 8 min  9 min  9 min  +cervical 10 min  12 min  8 min  14min                                                                            ROM  35 passive and active 35 A 38 P      23-27  25-30  30 active and passive  30-33 mm  35 passive and active         Exercise Diary                        TMJ active opening x10 with 5 sec hold  x10 with 5 sec hold  x10 with 5 sec hold  x10 with 5 sec hold  x 10 with 5  Sec hold  x 8  x8  x 10  x 10  x 10   Passive mandibular depression x5 with 10 sec hold  x5 with 10 sec hold  x 5 with 10 sec hold  x 5 10 sec hold  x 5 with 10 sechold  x 5  x 5  x5  x5  x5   Scapular retraction apollo 45# x 15  apollo 45# x 15 Apollo 45# x 15  apollo 45# x 17 Apollo 45# x 20  apollo 15# x 20  apollo 20# x 20  apollo 25# 20  apollo 30# x 20  apollo 35# x 20   Cervical retraction x10 with 5 sec hold  x 10 with 5 sechold  x 10  x 10  x 10  x 10  x 10  x10  x 10  x 10   Shoulder extension apollo 45# x 20 Apollo 55# x 10  apollo 55# x 12 apollo 55#x 12   apollo 55# x 14  apollo 35# x 20  apollo 45# x 15  apollo 45# x 15  apollo 45# x 16  apollo 45# x 16    supine retraction  x10 with 5 sec hold  x 10  x 10 with 5 sec hold  x 10  x 10  x 10 with 5 sec hold  x 10  x 10  x 10  x 10   Supine chin tuck with neck flexion  10 with 5 sec hold  x 10 x10 with 5 sec hold  x 10  x 10  x9  x 10  x 11  x 10  x 10    prone on elbows with neck extension  x 10 with 5 sec hold  x 10  x 10  x 10  x10 x5 with 5 sechold  x7 with 5 sec hold  x 10 with 5 sec hold  x 10 with 5 sec hold  x 10                                                                                                                                                                                                                                                                                                           Assessment: Tolerated treatment well  Patient would benefit from continued PT  Tight in B mandibular muscles but looser in her neck  Plan: Continue per plan of care   One more visit then DC to home program

## 2018-10-04 ENCOUNTER — ANNUAL EXAM (OUTPATIENT)
Dept: GYNECOLOGY | Facility: CLINIC | Age: 23
End: 2018-10-04
Payer: COMMERCIAL

## 2018-10-04 VITALS
SYSTOLIC BLOOD PRESSURE: 138 MMHG | BODY MASS INDEX: 43.4 KG/M2 | WEIGHT: 293 LBS | DIASTOLIC BLOOD PRESSURE: 80 MMHG | HEIGHT: 69 IN

## 2018-10-04 DIAGNOSIS — Z30.41 ENCOUNTER FOR SURVEILLANCE OF CONTRACEPTIVE PILLS: ICD-10-CM

## 2018-10-04 DIAGNOSIS — N76.0 VAGINITIS AND VULVOVAGINITIS: ICD-10-CM

## 2018-10-04 DIAGNOSIS — Z01.419 ENCOUNTER FOR GYNECOLOGICAL EXAMINATION WITHOUT ABNORMAL FINDING: Primary | ICD-10-CM

## 2018-10-04 DIAGNOSIS — Z12.4 ENCOUNTER FOR PAPANICOLAOU SMEAR FOR CERVICAL CANCER SCREENING: ICD-10-CM

## 2018-10-04 PROCEDURE — S0612 ANNUAL GYNECOLOGICAL EXAMINA: HCPCS | Performed by: OBSTETRICS & GYNECOLOGY

## 2018-10-04 PROCEDURE — G0145 SCR C/V CYTO,THINLAYER,RESCR: HCPCS | Performed by: OBSTETRICS & GYNECOLOGY

## 2018-10-04 RX ORDER — ESTRADIOL 0.1 MG/G
1 CREAM VAGINAL WEEKLY
Qty: 42.5 G | Refills: 3 | Status: SHIPPED | OUTPATIENT
Start: 2018-10-04

## 2018-10-04 RX ORDER — NORETHINDRONE ACETATE AND ETHINYL ESTRADIOL AND FERROUS FUMARATE 1.5-30(21)
1 KIT ORAL DAILY
Qty: 28 TABLET | Refills: 11 | Status: SHIPPED | OUTPATIENT
Start: 2018-10-04 | End: 2019-10-08 | Stop reason: SDUPTHER

## 2018-10-04 RX ORDER — NORETHINDRONE ACETATE AND ETHINYL ESTRADIOL AND FERROUS FUMARATE 1.5-30(21)
KIT ORAL
COMMUNITY
Start: 2018-09-21 | End: 2018-10-04 | Stop reason: SDUPTHER

## 2018-10-04 NOTE — PROGRESS NOTES
Assessment/Plan:         Diagnoses and all orders for this visit:    Encounter for gynecological examination without abnormal finding    Vaginitis and vulvovaginitis ( physiologic )  -     estradiol (ESTRACE) 0 1 mg/g vaginal cream; Insert 1 g into the vagina once a week    Encounter for surveillance of contraceptive pills  -     JUNEL FE 1 5/30 1 5-30 MG-MCG tablet; Take 1 tablet by mouth daily for 28 days    Other orders  -     Discontinue: JUNEL FE 1 5/30 1 5-30 MG-MCG tablet;         Subjective:      Patient ID: Wai Pickard is a 21 y o  female  HPI  Annual exam  C/O vaginal dryness  On Junel No other c/o    The following portions of the patient's history were reviewed and updated as appropriate: allergies, current medications, past family history, past medical history, past social history, past surgical history and problem list     Review of Systems   Constitutional: Negative  Gastrointestinal: Negative  Genitourinary: Negative  Objective:      /80   Ht 5' 9" (1 753 m)   Wt (!) 150 kg (330 lb)   LMP 09/17/2018   BMI 48 73 kg/m²          Physical Exam   Constitutional: She appears well-nourished  Neck: Normal range of motion  Neck supple  No thyromegaly present  Cardiovascular: Normal rate, regular rhythm and normal heart sounds  Pulmonary/Chest: Effort normal and breath sounds normal  No respiratory distress  Right breast exhibits no inverted nipple, no mass, no nipple discharge, no skin change and no tenderness  Left breast exhibits no inverted nipple, no mass, no nipple discharge, no skin change and no tenderness  Breasts are symmetrical    Abdominal: Soft  Bowel sounds are normal  She exhibits no distension and no mass  There is no tenderness  Hernia confirmed negative in the right inguinal area and confirmed negative in the left inguinal area  Genitourinary: There is no rash or lesion on the right labia  There is no rash or lesion on the left labia   Uterus is not deviated, not enlarged, not fixed and not tender  Cervix exhibits no motion tenderness, no discharge and no friability  Right adnexum displays no mass, no tenderness and no fullness  Left adnexum displays no mass, no tenderness and no fullness  No erythema or bleeding in the vagina  No vaginal discharge found  Lymphadenopathy:        Right: No inguinal adenopathy present  Left: No inguinal adenopathy present

## 2018-10-08 LAB
LAB AP GYN PRIMARY INTERPRETATION: NORMAL
Lab: NORMAL

## 2018-10-17 ENCOUNTER — OFFICE VISIT (OUTPATIENT)
Dept: PHYSICAL THERAPY | Facility: REHABILITATION | Age: 23
End: 2018-10-17
Payer: COMMERCIAL

## 2018-10-17 DIAGNOSIS — R68.84 JAW PAIN: Primary | ICD-10-CM

## 2018-10-17 PROCEDURE — 97140 MANUAL THERAPY 1/> REGIONS: CPT | Performed by: PHYSICAL THERAPIST

## 2018-10-17 PROCEDURE — G8992 OTHER PT/OT  D/C STATUS: HCPCS | Performed by: PHYSICAL THERAPIST

## 2018-10-17 PROCEDURE — G8991 OTHER PT/OT GOAL STATUS: HCPCS | Performed by: PHYSICAL THERAPIST

## 2018-10-17 PROCEDURE — 97112 NEUROMUSCULAR REEDUCATION: CPT | Performed by: PHYSICAL THERAPIST

## 2018-10-17 NOTE — PROGRESS NOTES
Daily Note     Today's date: 10/17/2018  Patient name: Nicanor Thurston  : 1995  MRN: [id number]  Referring provider: Aislinn Mazariegos DMD  Dx:   Encounter Diagnosis     ICD-10-CM    1  Jaw pain R68 84        Start Time: 06          Subjective: I think I'm getting sick again      Objective: See treatment diary below  Precautions: TMJ surgery  Daily Treatment Diary      Manual  7/25  8/16  8/30  9/12  9/26  10/17  6/27  7/5  7/11  7/18   TMJ B MFR 12 min  5 min  11 min 8 min  9 min  9 min  +cervical 10 min  12 min  8 min  14min                                                                            ROM  35 passive and active 35 A 38 P      35 mm  25-30  30 active and passive  30-33 mm  35 passive and active         Exercise Diary                        TMJ active opening x10 with 5 sec hold  x10 with 5 sec hold  x10 with 5 sec hold  x10 with 5 sec hold  x 10 with 5  Sec hold  x 10   x8  x 10  x 10  x 10   Passive mandibular depression x5 with 10 sec hold  x5 with 10 sec hold  x 5 with 10 sec hold  x 5 10 sec hold  x 5 with 10 sechold  x 5 with 10 sec hold  x 5  x5  x5  x5   Scapular retraction apollo 45# x 15  apollo 45# x 15 Apollo 45# x 15  apollo 45# x 17 Apollo 45# x 20  apollo 55# x 10  apollo 20# x 20  apollo 25# 20  apollo 30# x 20  apollo 35# x 20   Cervical retraction x10 with 5 sec hold  x 10 with 5 sechold  x 10  x 10  x 10  x 10  x 10  x10  x 10  x 10   Shoulder extension apollo 45# x 20 Apollo 55# x 10  apollo 55# x 12 apollo 55#x 12   apollo 55# x 14  apollo 55# x 15  apollo 45# x 15  apollo 45# x 15  apollo 45# x 16  apollo 45# x 16    supine retraction  x10 with 5 sec hold  x 10  x 10 with 5 sec hold  x 10  x 10  x 10 with 5 sec hold  x 10  x 10  x 10  x 10   Supine chin tuck with neck flexion  10 with 5 sec hold  x 10 x10 with 5 sec hold  x 10  x 10  x10  x 10  x 11  x 10  x 10    prone on elbows with neck extension  x 10 with 5 sec hold  x 10  x 10  x 10  x10 x10 with 5 sechold  x7 with 5 sec hold  x 10 with 5 sec hold  x 10 with 5 sec hold  x 10                                                                                                                                                                                                                                                                                                           Assessment: Tolerated treatment well   Patient appears ready for DC to home program        Plan: DC to home program

## 2019-10-08 ENCOUNTER — ANNUAL EXAM (OUTPATIENT)
Dept: GYNECOLOGY | Facility: CLINIC | Age: 24
End: 2019-10-08
Payer: COMMERCIAL

## 2019-10-08 VITALS
HEIGHT: 69 IN | SYSTOLIC BLOOD PRESSURE: 124 MMHG | WEIGHT: 293 LBS | BODY MASS INDEX: 43.4 KG/M2 | DIASTOLIC BLOOD PRESSURE: 84 MMHG

## 2019-10-08 DIAGNOSIS — R10.2 PELVIC PAIN: ICD-10-CM

## 2019-10-08 DIAGNOSIS — Z30.41 ENCOUNTER FOR SURVEILLANCE OF CONTRACEPTIVE PILLS: ICD-10-CM

## 2019-10-08 DIAGNOSIS — Z01.419 ENCOUNTER FOR GYNECOLOGICAL EXAMINATION WITH PAPANICOLAOU SMEAR OF CERVIX: Primary | ICD-10-CM

## 2019-10-08 PROCEDURE — S0612 ANNUAL GYNECOLOGICAL EXAMINA: HCPCS | Performed by: OBSTETRICS & GYNECOLOGY

## 2019-10-08 PROCEDURE — G0145 SCR C/V CYTO,THINLAYER,RESCR: HCPCS | Performed by: OBSTETRICS & GYNECOLOGY

## 2019-10-08 RX ORDER — NORETHINDRONE ACETATE AND ETHINYL ESTRADIOL AND FERROUS FUMARATE 1.5-30(21)
1 KIT ORAL DAILY
Qty: 28 TABLET | Refills: 11 | Status: SHIPPED | OUTPATIENT
Start: 2019-10-08 | End: 2020-10-12 | Stop reason: SDUPTHER

## 2019-10-08 NOTE — PROGRESS NOTES
Assessment/Plan:         Diagnoses and all orders for this visit:    Encounter for gynecological examination with Papanicolaou smear of cervix  -     Liquid-based pap, screening    Pelvic pain--suspect musculoskeletal pain  Will check TVS        Subjective:      Patient ID: Cierra Ash is a 25 y o  female  HPI  patient presents for annual examination  She is presently on oral contraceptives and is doing well on this  She is having some intermittent mild right lower quadrant/pelvic pain  She typically has this when she coughs or sneezes  Her menses are regular  She has no associated nausea, vomiting, diarrhea or urinary complaints  The following portions of the patient's history were reviewed and updated as appropriate:   She  has no past medical history on file  She There are no active problems to display for this patient  She  has a past surgical history that includes Knee surgery  Her family history is not on file  She  reports that she has never smoked  She has never used smokeless tobacco  She reports that she does not drink alcohol or use drugs  Current Outpatient Medications   Medication Sig Dispense Refill    estradiol (ESTRACE) 0 1 mg/g vaginal cream Insert 1 g into the vagina once a week (Patient not taking: Reported on 10/8/2019) 42 5 g 3    JUNEL FE 1 5/30 1 5-30 MG-MCG tablet Take 1 tablet by mouth daily for 28 days 28 tablet 11     No current facility-administered medications for this visit  Current Outpatient Medications on File Prior to Visit   Medication Sig    estradiol (ESTRACE) 0 1 mg/g vaginal cream Insert 1 g into the vagina once a week (Patient not taking: Reported on 10/8/2019)   [place] Pacoes FE 1 5/30 1 5-30 MG-MCG tablet Take 1 tablet by mouth daily for 28 days     No current facility-administered medications on file prior to visit  She is allergic to cat hair extract and neosporin [neomycin-bacitracin zn-polymyx]       Review of Systems   Constitutional: Negative  HENT: Negative for sore throat and trouble swallowing  Gastrointestinal: Positive for abdominal pain  Negative for abdominal distention, blood in stool, constipation, diarrhea, nausea and vomiting  Genitourinary: Negative  Objective:      /84 (BP Location: Left arm)   Ht 5' 9" (1 753 m)   Wt (!) 159 kg (350 lb 3 2 oz)   LMP 09/24/2019 (Exact Date)   BMI 51 72 kg/m²          Physical Exam   Neck: Normal range of motion  Neck supple  No thyromegaly present  Cardiovascular: Normal rate, regular rhythm and normal heart sounds  Pulmonary/Chest: Effort normal and breath sounds normal  No respiratory distress  Right breast exhibits no inverted nipple, no mass, no nipple discharge, no skin change and no tenderness  Left breast exhibits no inverted nipple, no mass, no nipple discharge, no skin change and no tenderness  Abdominal: Soft  Bowel sounds are normal  She exhibits no distension and no mass  There is no tenderness  There is no rebound and no guarding  Hernia confirmed negative in the right inguinal area and confirmed negative in the left inguinal area  Genitourinary: There is no rash, tenderness or lesion on the right labia  There is no rash, tenderness or lesion on the left labia  Uterus is not deviated, not enlarged, not fixed and not tender  Cervix exhibits no motion tenderness, no discharge and no friability  Right adnexum displays no mass, no tenderness and no fullness  Left adnexum displays no mass, no tenderness and no fullness  No erythema, tenderness or bleeding in the vagina  No vaginal discharge found  Lymphadenopathy:     She has no cervical adenopathy  No inguinal adenopathy noted on the right or left side

## 2019-10-12 LAB
LAB AP GYN PRIMARY INTERPRETATION: NORMAL
Lab: NORMAL

## 2019-10-16 ENCOUNTER — ULTRASOUND (OUTPATIENT)
Dept: GYNECOLOGY | Facility: CLINIC | Age: 24
End: 2019-10-16
Payer: COMMERCIAL

## 2019-10-16 ENCOUNTER — OFFICE VISIT (OUTPATIENT)
Dept: GYNECOLOGY | Facility: CLINIC | Age: 24
End: 2019-10-16
Payer: COMMERCIAL

## 2019-10-16 DIAGNOSIS — R10.31 ABDOMINAL DISCOMFORT IN RIGHT LOWER QUADRANT: Primary | ICD-10-CM

## 2019-10-16 DIAGNOSIS — R10.2 PELVIC PAIN: Primary | ICD-10-CM

## 2019-10-16 PROCEDURE — 99212 OFFICE O/P EST SF 10 MIN: CPT | Performed by: OBSTETRICS & GYNECOLOGY

## 2019-10-16 PROCEDURE — 76830 TRANSVAGINAL US NON-OB: CPT | Performed by: OBSTETRICS & GYNECOLOGY

## 2019-10-16 NOTE — PROGRESS NOTES
Assessment/Plan:    Reviewed transvaginal sonography results with patient  I suspect the pain she is experiencing is musculoskeletal in origin  Uterus tubes and ovaries appear normal on ultrasound  If this discomfort continues to bother I would recommend physical therapy     Diagnoses and all orders for this visit:    Abdominal discomfort in right lower quadrant        Subjective:      Patient ID: Arminda Santiago is a 25 y o  female  HPI  She is having some intermittent mild right lower quadrant/pelvic pain  She typically has this when she coughs or sneezes  Her menses are regular  She has no associated nausea, vomiting, diarrhea or urinary complaints  Here for TVS      AMB US Pelvic Non OB [[id number]] ordered by Hari Gallego at 10/16/19 1303    Post-procedure Diagnoses   1  Pelvic pain [R10 2]     Show:Clear all  [x]Manual[x]Template[]Copied    Added by:  [x]Harmony Jules    []Americo for details  AMB US Pelvic Non OB  Date/Time: 10/16/2019 1:03 PM  Performed by: Hari Gallego  Authorized by: Arpita Mercedes DO      Procedure details:     Indications: non-obstetric abdominal pain      Technique:  Transvaginal US, Non-OB    Position: lithotomy exam    Uterine findings:     Length (cm): 7 02    Height (cm):  3 8    Width (cm):  4 95    Endometrial stripe: identified      Endometrium thickness (mm):  7 3  Left ovary findings:     Left ovary:  Visualized    Length (cm): 2 65    Height (cm): 1 65    Width (cm): 2 15  Right ovary findings:     Right ovary:  Visualized    Length (cm): 3 13    Height (cm): 1 62    Width (cm): 2 41  Other findings:     Free pelvic fluid: not identified    Post-Procedure Details:     Impression:  Anteverted uterus is inhomogeneous throughout without fibroids  Bilateral ovaries appear within normal limits  No free fluid  Tolerance:   Tolerated well, no immediate complications    Complications: no complications              The following portions of the patient's history were reviewed and updated as appropriate:   She  has no past medical history on file  She There are no active problems to display for this patient  She  has a past surgical history that includes Knee surgery  Her family history is not on file  She  reports that she has never smoked  She has never used smokeless tobacco  She reports that she does not drink alcohol or use drugs  Current Outpatient Medications   Medication Sig Dispense Refill    estradiol (ESTRACE) 0 1 mg/g vaginal cream Insert 1 g into the vagina once a week (Patient not taking: Reported on 10/8/2019) 42 5 g 3    JUNEL FE 1 5/30 1 5-30 MG-MCG tablet Take 1 tablet by mouth daily for 28 days 28 tablet 11     No current facility-administered medications for this visit  Current Outpatient Medications on File Prior to Visit   Medication Sig    estradiol (ESTRACE) 0 1 mg/g vaginal cream Insert 1 g into the vagina once a week (Patient not taking: Reported on 10/8/2019)   Katy Mars FE 1 5/30 1 5-30 MG-MCG tablet Take 1 tablet by mouth daily for 28 days     No current facility-administered medications on file prior to visit  She is allergic to cat hair extract and neosporin [neomycin-bacitracin zn-polymyx]       Review of Systems      Objective:      LMP 09/24/2019 (Exact Date)          Physical Exam

## 2019-10-16 NOTE — PROGRESS NOTES
AMB US Pelvic Non OB  Date/Time: 10/16/2019 1:03 PM  Performed by: Hari Gallego  Authorized by: Arpita Mercedes DO     Procedure details:     Indications: non-obstetric abdominal pain      Technique:  Transvaginal US, Non-OB    Position: lithotomy exam    Uterine findings:     Length (cm): 7 02    Height (cm):  3 8    Width (cm):  4 95    Endometrial stripe: identified      Endometrium thickness (mm):  7 3  Left ovary findings:     Left ovary:  Visualized    Length (cm): 2 65    Height (cm): 1 65    Width (cm): 2 15  Right ovary findings:     Right ovary:  Visualized    Length (cm): 3 13    Height (cm): 1 62    Width (cm): 2 41  Other findings:     Free pelvic fluid: not identified    Post-Procedure Details:     Impression:  Anteverted uterus is inhomogeneous throughout without fibroids  Bilateral ovaries appear within normal limits  No free fluid  Tolerance: Tolerated well, no immediate complications    Complications: no complications    Additional Procedure Comments:      GE Logiq P5 transvaginal transducer E8C with Serial Number [id number] was used during procedure and subsequently cleaned with high level disinfection utilizing the multiBIND biotec         Ultrasound performed at:     [facility] for [address]  [address], [address]   Phone: [phone]  Fax:  [phone]

## 2020-09-10 DIAGNOSIS — N91.2 AMENORRHEA: Primary | ICD-10-CM

## 2020-09-10 DIAGNOSIS — N92.0 SPOTTING: ICD-10-CM

## 2020-09-10 NOTE — PROGRESS NOTES
Pt called she has not been feeling good last several days  Nausea, fullness    Took a preg test last night it was positive,  Took one this morning it was negative  Would like labs done to confirm,  Sent to [facility] [place]

## 2020-09-14 ENCOUNTER — TELEPHONE (OUTPATIENT)
Dept: GYNECOLOGY | Facility: CLINIC | Age: 25
End: 2020-09-14

## 2020-10-12 DIAGNOSIS — Z30.41 ENCOUNTER FOR SURVEILLANCE OF CONTRACEPTIVE PILLS: ICD-10-CM

## 2020-10-12 RX ORDER — NORETHINDRONE ACETATE AND ETHINYL ESTRADIOL AND FERROUS FUMARATE 1.5-30(21)
1 KIT ORAL DAILY
Qty: 28 TABLET | Refills: 11 | Status: SHIPPED | OUTPATIENT
Start: 2020-10-12 | End: 2020-10-29 | Stop reason: SDUPTHER

## 2020-10-29 ENCOUNTER — ANNUAL EXAM (OUTPATIENT)
Dept: GYNECOLOGY | Facility: CLINIC | Age: 25
End: 2020-10-29
Payer: COMMERCIAL

## 2020-10-29 VITALS
SYSTOLIC BLOOD PRESSURE: 118 MMHG | WEIGHT: 293 LBS | HEIGHT: 69 IN | HEART RATE: 101 BPM | TEMPERATURE: 95.5 F | DIASTOLIC BLOOD PRESSURE: 76 MMHG | BODY MASS INDEX: 43.4 KG/M2

## 2020-10-29 DIAGNOSIS — Z01.419 ENCOUNTER FOR GYNECOLOGICAL EXAMINATION WITHOUT ABNORMAL FINDING: ICD-10-CM

## 2020-10-29 DIAGNOSIS — N92.1 BREAKTHROUGH BLEEDING ON BIRTH CONTROL PILLS: ICD-10-CM

## 2020-10-29 DIAGNOSIS — Z01.419 ENCOUNTER FOR GYNECOLOGICAL EXAMINATION WITH PAPANICOLAOU SMEAR OF CERVIX: ICD-10-CM

## 2020-10-29 DIAGNOSIS — Z30.41 ENCOUNTER FOR SURVEILLANCE OF CONTRACEPTIVE PILLS: Primary | ICD-10-CM

## 2020-10-29 PROCEDURE — G0145 SCR C/V CYTO,THINLAYER,RESCR: HCPCS | Performed by: OBSTETRICS & GYNECOLOGY

## 2020-10-29 PROCEDURE — S0612 ANNUAL GYNECOLOGICAL EXAMINA: HCPCS | Performed by: OBSTETRICS & GYNECOLOGY

## 2020-10-29 RX ORDER — NORETHINDRONE ACETATE AND ETHINYL ESTRADIOL AND FERROUS FUMARATE 1.5-30(21)
1 KIT ORAL DAILY
Qty: 28 TABLET | Refills: 11 | Status: SHIPPED | OUTPATIENT
Start: 2020-10-29 | End: 2021-11-06

## 2020-11-04 LAB
LAB AP GYN PRIMARY INTERPRETATION: NORMAL
Lab: NORMAL

## 2021-11-06 DIAGNOSIS — Z30.41 ENCOUNTER FOR SURVEILLANCE OF CONTRACEPTIVE PILLS: ICD-10-CM

## 2021-11-06 RX ORDER — NORETHINDRONE ACETATE AND ETHINYL ESTRADIOL AND FERROUS FUMARATE 1.5-30(21)
KIT ORAL
Qty: 28 TABLET | Refills: 11 | Status: SHIPPED | OUTPATIENT
Start: 2021-11-06